# Patient Record
Sex: MALE | Race: WHITE | Employment: FULL TIME | URBAN - METROPOLITAN AREA
[De-identification: names, ages, dates, MRNs, and addresses within clinical notes are randomized per-mention and may not be internally consistent; named-entity substitution may affect disease eponyms.]

---

## 2017-12-17 ENCOUNTER — HOSPITAL ENCOUNTER (OUTPATIENT)
Age: 42
Discharge: HOME | End: 2017-12-17
Attending: EMERGENCY MEDICINE
Payer: COMMERCIAL

## 2017-12-17 VITALS
OXYGEN SATURATION: 96 % | WEIGHT: 288 LBS | BODY MASS INDEX: 33.32 KG/M2 | TEMPERATURE: 98 F | RESPIRATION RATE: 18 BRPM | SYSTOLIC BLOOD PRESSURE: 154 MMHG | DIASTOLIC BLOOD PRESSURE: 95 MMHG | HEIGHT: 78 IN | HEART RATE: 82 BPM

## 2017-12-17 DIAGNOSIS — J01.90 ACUTE SINUSITIS, RECURRENCE NOT SPECIFIED, UNSPECIFIED LOCATION: Primary | ICD-10-CM

## 2017-12-17 PROCEDURE — 99214 OFFICE O/P EST MOD 30 MIN: CPT

## 2017-12-17 PROCEDURE — 99213 OFFICE O/P EST LOW 20 MIN: CPT

## 2017-12-17 RX ORDER — AZITHROMYCIN 250 MG/1
TABLET, FILM COATED ORAL
Qty: 1 PACKAGE | Refills: 0 | Status: SHIPPED | OUTPATIENT
Start: 2017-12-17 | End: 2017-12-22

## 2017-12-17 NOTE — ED PROVIDER NOTES
Patient Seen in: [facility]    History   Patient presents with:  Ear Problem Pain (neurosensory)    Stated Complaint: ear pain    HPI    This patient complains of bilateral ear pain with the right being more severe than t awake and alert nontoxic in appearance  Eyes pupils are equal and reactive  Ears tympanic membranes are mildly dull and opaque bilaterally not hyperemic.   On exam of the mouth there is pharyngeal erythema no exudate uvula midline  Neck there is bilateral s

## 2018-08-30 ENCOUNTER — HOSPITAL ENCOUNTER (OUTPATIENT)
Age: 43
Discharge: HOME | End: 2018-08-30
Payer: COMMERCIAL

## 2018-08-30 VITALS
WEIGHT: 285 LBS | TEMPERATURE: 98 F | BODY MASS INDEX: 32.97 KG/M2 | OXYGEN SATURATION: 100 % | HEIGHT: 78 IN | DIASTOLIC BLOOD PRESSURE: 87 MMHG | SYSTOLIC BLOOD PRESSURE: 132 MMHG | HEART RATE: 66 BPM | RESPIRATION RATE: 18 BRPM

## 2018-08-30 DIAGNOSIS — J04.0 ACUTE LARYNGITIS: Primary | ICD-10-CM

## 2018-08-30 LAB — S PYO AG THROAT QL: NEGATIVE

## 2018-08-30 PROCEDURE — 99212 OFFICE O/P EST SF 10 MIN: CPT

## 2018-08-30 PROCEDURE — 87430 STREP A AG IA: CPT

## 2018-08-30 NOTE — ED INITIAL ASSESSMENT (HPI)
C/o sore throat last couple days. Bilateral ear pain. Hoarse voice. No fever. No N/V/D. Headache at times. Minimal congestion.

## 2018-08-30 NOTE — ED PROVIDER NOTES
Patient Seen in: 5 [facility] beds at the [facility]    History   Patient presents with:  Sore Throat    Stated Complaint: Sore Throat/Ears    HPI    Patient is an otherwise healthy 54-year-old male who presents for evaluation of sore throat, sony Normocephalic and atraumatic.    Right Ear: Hearing, tympanic membrane, external ear and ear canal normal.   Left Ear: Hearing, tympanic membrane, external ear and ear canal normal.   Nose: Nose normal. Right sinus exhibits no maxillary sinus tenderness and

## 2018-09-11 ENCOUNTER — NURSE TRIAGE (OUTPATIENT)
Dept: OTHER | Age: 43
End: 2018-09-11

## 2018-09-11 NOTE — TELEPHONE ENCOUNTER
Action Requested: Summary for Provider     []  Critical Lab, Recommendations Needed  [] Need Additional Advice  []   FYI    []   Need Orders  [] Need Medications Sent to Pharmacy  []  Other     SUMMARY: appt scheduled 9/12/18 to see PCP  PAM Harman

## 2018-09-12 ENCOUNTER — OFFICE VISIT (OUTPATIENT)
Dept: INTERNAL MEDICINE CLINIC | Facility: CLINIC | Age: 43
End: 2018-09-12
Payer: COMMERCIAL

## 2018-09-12 ENCOUNTER — HOSPITAL ENCOUNTER (OUTPATIENT)
Age: 43
Discharge: HOME | End: 2018-09-12
Attending: EMERGENCY MEDICINE
Payer: COMMERCIAL

## 2018-09-12 VITALS
RESPIRATION RATE: 18 BRPM | BODY MASS INDEX: 34.41 KG/M2 | WEIGHT: 297.38 LBS | TEMPERATURE: 98 F | HEIGHT: 78 IN | HEART RATE: 68 BPM | OXYGEN SATURATION: 98 % | SYSTOLIC BLOOD PRESSURE: 149 MMHG | DIASTOLIC BLOOD PRESSURE: 84 MMHG

## 2018-09-12 VITALS
HEART RATE: 67 BPM | RESPIRATION RATE: 20 BRPM | TEMPERATURE: 99 F | DIASTOLIC BLOOD PRESSURE: 95 MMHG | SYSTOLIC BLOOD PRESSURE: 140 MMHG

## 2018-09-12 DIAGNOSIS — L03.119 CELLULITIS OF FOOT: Primary | ICD-10-CM

## 2018-09-12 DIAGNOSIS — J30.2 SEASONAL ALLERGIC RHINITIS, UNSPECIFIED TRIGGER: ICD-10-CM

## 2018-09-12 DIAGNOSIS — Z12.5 SCREENING PSA (PROSTATE SPECIFIC ANTIGEN): ICD-10-CM

## 2018-09-12 DIAGNOSIS — R74.8 ELEVATED LIVER ENZYMES: ICD-10-CM

## 2018-09-12 DIAGNOSIS — R05.9 COUGH: Primary | ICD-10-CM

## 2018-09-12 DIAGNOSIS — Z00.00 PHYSICAL EXAM: ICD-10-CM

## 2018-09-12 DIAGNOSIS — E78.00 PURE HYPERCHOLESTEROLEMIA: ICD-10-CM

## 2018-09-12 PROCEDURE — 99214 OFFICE O/P EST MOD 30 MIN: CPT

## 2018-09-12 PROCEDURE — 99212 OFFICE O/P EST SF 10 MIN: CPT | Performed by: INTERNAL MEDICINE

## 2018-09-12 PROCEDURE — 99213 OFFICE O/P EST LOW 20 MIN: CPT

## 2018-09-12 PROCEDURE — 99214 OFFICE O/P EST MOD 30 MIN: CPT | Performed by: INTERNAL MEDICINE

## 2018-09-12 RX ORDER — CEPHALEXIN 500 MG/1
500 CAPSULE ORAL 4 TIMES DAILY
Qty: 28 CAPSULE | Refills: 0 | Status: SHIPPED | OUTPATIENT
Start: 2018-09-12 | End: 2018-09-19

## 2018-09-12 RX ORDER — FLUTICASONE PROPIONATE 50 MCG
2 SPRAY, SUSPENSION (ML) NASAL DAILY
Qty: 1 BOTTLE | Refills: 0 | Status: SHIPPED | OUTPATIENT
Start: 2018-09-12 | End: 2019-09-07

## 2018-09-12 RX ORDER — LORATADINE 10 MG/1
10 TABLET ORAL DAILY
COMMUNITY

## 2018-09-12 RX ORDER — ALBUTEROL SULFATE 90 UG/1
2 AEROSOL, METERED RESPIRATORY (INHALATION) EVERY 4 HOURS PRN
Qty: 1 INHALER | Refills: 6 | Status: SHIPPED | OUTPATIENT
Start: 2018-09-12

## 2018-09-12 NOTE — PROGRESS NOTES
HPI:    Patient ID: Nayely Werner is a 39year old male. Cough   Chronicity: pt  state has allergies and  has some  wheezing   ,due to    allergies  - happlens  in  fall   season  The current episode started in the past 7 days.  The problem has been DM-Guaifenesin ER (MUCINEX DM)  MG Oral Tablet 12 Hr Take 1 tablet by mouth every 12 (twelve) hours. Disp:  Rfl:      Allergies:  Skelaxin [Metaxalon*       PHYSICAL EXAM:   Physical Exam   Constitutional: He is oriented to person, place, and time. Is   Cough   worsening  Might  Need   z pack   - Albuterol Sulfate HFA (PROAIR HFA) 108 (90 Base) MCG/ACT Inhalation Aero Soln; Inhale 2 puffs into the lungs every 4 (four) hours as needed for Wheezing. Dispense: 1 Inhaler;  Refill: 6  - UA MICROSCOPIC O Aero Soln 1 Inhaler 6     Sig: Inhale 2 puffs into the lungs every 4 (four) hours as needed for Wheezing. • Fluticasone Propionate 50 MCG/ACT Nasal Suspension 1 Bottle 0     Si sprays by Each Nare route daily.  Apply two puffs in each nostril once ruth

## 2018-09-15 ENCOUNTER — LAB ENCOUNTER (OUTPATIENT)
Dept: LAB | Age: 43
End: 2018-09-15
Attending: INTERNAL MEDICINE
Payer: COMMERCIAL

## 2018-09-15 DIAGNOSIS — R05.9 COUGH: ICD-10-CM

## 2018-09-15 DIAGNOSIS — R74.8 ELEVATED LIVER ENZYMES: ICD-10-CM

## 2018-09-15 DIAGNOSIS — E78.00 PURE HYPERCHOLESTEROLEMIA: ICD-10-CM

## 2018-09-15 DIAGNOSIS — Z12.5 SCREENING PSA (PROSTATE SPECIFIC ANTIGEN): ICD-10-CM

## 2018-09-15 DIAGNOSIS — Z00.00 PHYSICAL EXAM: ICD-10-CM

## 2018-09-15 DIAGNOSIS — J30.2 SEASONAL ALLERGIC RHINITIS, UNSPECIFIED TRIGGER: ICD-10-CM

## 2018-09-15 LAB
ALBUMIN SERPL BCP-MCNC: 4.3 G/DL (ref 3.5–4.8)
ALBUMIN/GLOB SERPL: 1.2 {RATIO} (ref 1–2)
ALP SERPL-CCNC: 41 U/L (ref 32–100)
ALT SERPL-CCNC: 32 U/L (ref 17–63)
ANION GAP SERPL CALC-SCNC: 10 MMOL/L (ref 0–18)
AST SERPL-CCNC: 21 U/L (ref 15–41)
BACTERIA UR QL AUTO: NEGATIVE /HPF
BASOPHILS # BLD: 0.1 K/UL (ref 0–0.2)
BASOPHILS NFR BLD: 1 %
BILIRUB SERPL-MCNC: 0.5 MG/DL (ref 0.3–1.2)
BUN SERPL-MCNC: 17 MG/DL (ref 8–20)
BUN/CREAT SERPL: 19.5 (ref 10–20)
CALCIUM SERPL-MCNC: 9.5 MG/DL (ref 8.5–10.5)
CHLORIDE SERPL-SCNC: 105 MMOL/L (ref 95–110)
CHOLEST SERPL-MCNC: 178 MG/DL (ref 110–200)
CO2 SERPL-SCNC: 26 MMOL/L (ref 22–32)
CREAT SERPL-MCNC: 0.87 MG/DL (ref 0.5–1.5)
EOSINOPHIL # BLD: 0.2 K/UL (ref 0–0.7)
EOSINOPHIL NFR BLD: 3 %
ERYTHROCYTE [DISTWIDTH] IN BLOOD BY AUTOMATED COUNT: 13.1 % (ref 11–15)
GGT SERPL-CCNC: 30 U/L (ref 7–50)
GLOBULIN PLAS-MCNC: 3.6 G/DL (ref 2.5–3.7)
GLUCOSE SERPL-MCNC: 93 MG/DL (ref 70–99)
HCT VFR BLD AUTO: 45.1 % (ref 41–52)
HDLC SERPL-MCNC: 38 MG/DL
HGB BLD-MCNC: 15.1 G/DL (ref 13.5–17.5)
LDLC SERPL CALC-MCNC: 113 MG/DL (ref 0–99)
LYMPHOCYTES # BLD: 1.8 K/UL (ref 1–4)
LYMPHOCYTES NFR BLD: 30 %
MCH RBC QN AUTO: 30.9 PG (ref 27–32)
MCHC RBC AUTO-ENTMCNC: 33.6 G/DL (ref 32–37)
MCV RBC AUTO: 92.1 FL (ref 80–100)
MONOCYTES # BLD: 0.6 K/UL (ref 0–1)
MONOCYTES NFR BLD: 11 %
NEUTROPHILS # BLD AUTO: 3.3 K/UL (ref 1.8–7.7)
NEUTROPHILS NFR BLD: 55 %
NONHDLC SERPL-MCNC: 140 MG/DL
OSMOLALITY UR CALC.SUM OF ELEC: 293 MOSM/KG (ref 275–295)
PATIENT FASTING: YES
PLATELET # BLD AUTO: 182 K/UL (ref 140–400)
PMV BLD AUTO: 10 FL (ref 7.4–10.3)
POTASSIUM SERPL-SCNC: 4.3 MMOL/L (ref 3.3–5.1)
PROT SERPL-MCNC: 7.9 G/DL (ref 5.9–8.4)
PSA SERPL-MCNC: 1 NG/ML (ref 0–4)
RBC # BLD AUTO: 4.89 M/UL (ref 4.5–5.9)
RBC #/AREA URNS AUTO: <1 /HPF
SODIUM SERPL-SCNC: 141 MMOL/L (ref 136–144)
TRIGL SERPL-MCNC: 135 MG/DL (ref 1–149)
TSH SERPL-ACNC: 1.71 UIU/ML (ref 0.45–5.33)
WBC # BLD AUTO: 5.9 K/UL (ref 4–11)
WBC #/AREA URNS AUTO: 1 /HPF

## 2018-09-15 PROCEDURE — 87340 HEPATITIS B SURFACE AG IA: CPT

## 2018-09-15 PROCEDURE — 84443 ASSAY THYROID STIM HORMONE: CPT

## 2018-09-15 PROCEDURE — 80500 HEPATITIS A B + C PROFILE: CPT

## 2018-09-15 PROCEDURE — 85025 COMPLETE CBC W/AUTO DIFF WBC: CPT

## 2018-09-15 PROCEDURE — 36415 COLL VENOUS BLD VENIPUNCTURE: CPT

## 2018-09-15 PROCEDURE — 80061 LIPID PANEL: CPT

## 2018-09-15 PROCEDURE — 82977 ASSAY OF GGT: CPT

## 2018-09-15 PROCEDURE — 81015 MICROSCOPIC EXAM OF URINE: CPT

## 2018-09-15 PROCEDURE — 86708 HEPATITIS A ANTIBODY: CPT

## 2018-09-15 PROCEDURE — 86704 HEP B CORE ANTIBODY TOTAL: CPT

## 2018-09-15 PROCEDURE — 86706 HEP B SURFACE ANTIBODY: CPT

## 2018-09-15 PROCEDURE — 80053 COMPREHEN METABOLIC PANEL: CPT

## 2018-09-15 PROCEDURE — 86803 HEPATITIS C AB TEST: CPT

## 2018-09-17 LAB
HAV AB SER QL IA: NONREACTIVE
HBV CORE AB SERPL QL IA: NONREACTIVE
HBV SURFACE AB SER-ACNC: <3.1 MIU/ML (ref ?–10)
HBV SURFACE AG SERPL QL IA: NONREACTIVE
HBV SURFACE AG SERPL QL IA: NONREACTIVE
HCV AB SERPL QL IA: NONREACTIVE

## 2018-09-19 ENCOUNTER — OFFICE VISIT (OUTPATIENT)
Dept: INTERNAL MEDICINE CLINIC | Facility: CLINIC | Age: 43
End: 2018-09-19
Payer: COMMERCIAL

## 2018-09-19 VITALS
WEIGHT: 298.5 LBS | RESPIRATION RATE: 20 BRPM | DIASTOLIC BLOOD PRESSURE: 90 MMHG | SYSTOLIC BLOOD PRESSURE: 147 MMHG | HEIGHT: 78 IN | TEMPERATURE: 98 F | HEART RATE: 67 BPM | BODY MASS INDEX: 34.54 KG/M2

## 2018-09-19 DIAGNOSIS — J30.1 ALLERGIC RHINITIS DUE TO POLLEN, UNSPECIFIED SEASONALITY: Primary | ICD-10-CM

## 2018-09-19 DIAGNOSIS — L03.115 CELLULITIS OF RIGHT LOWER EXTREMITY: ICD-10-CM

## 2018-09-19 PROBLEM — I10 ESSENTIAL HYPERTENSION: Status: ACTIVE | Noted: 2018-09-19

## 2018-09-19 PROCEDURE — 99212 OFFICE O/P EST SF 10 MIN: CPT | Performed by: INTERNAL MEDICINE

## 2018-09-19 PROCEDURE — 99214 OFFICE O/P EST MOD 30 MIN: CPT | Performed by: INTERNAL MEDICINE

## 2018-09-19 NOTE — PROGRESS NOTES
HPI:    Patient ID: Merly Castro is a 39year old male. Patient presents with:  Sore Throat: Pt state that he is presenting from   With cellulitis of right  foot.    Pt state that he has also some  cough and sore throat since this AM    right  foot days. Disp: 28 capsule Rfl: 0   Fluticasone Propionate 50 MCG/ACT Nasal Suspension 2 sprays by Each Nare route daily.  Apply two puffs in each nostril once daily for 1-2 weeks then as needed Disp: 1 Bottle Rfl: 0   DM-Guaifenesin ER (MUCINEX DM)  MG O r  Foot  Redness  Resolved   - no  Tenderness   Or  Erythema    Psychiatric: He has a normal mood and affect. His behavior is normal.   Nursing note and vitals reviewed.             Blood pressure 147/90, pulse 67, temperature 97.5 °F (36.4 °C), temperatu pressure and heart rate at home       Sore throat  No signs of infection  Likely due to postnasal drainage and clearing the throat often  Patient does have some allergy symptoms and will start using Claritin daily  Flonase 2 puffs in each nostril 1-2 weeks

## 2018-09-23 NOTE — ED PROVIDER NOTES
Patient Seen in: [facility]    History   Patient presents with:  Musculoskeletal Problem    Stated Complaint: Foot pain     HPI    48-year-old male presents for evaluation of right foot pain.   Patient reports approximatel oriented to person, place, and time. Skin: Skin is warm. Capillary refill takes less than 2 seconds. Psychiatric: He has a normal mood and affect.              ED Course   Labs Reviewed - No data to display             MDM   Differential diagnosis inclu

## 2020-11-18 ENCOUNTER — VIRTUAL PHONE E/M (OUTPATIENT)
Dept: INTERNAL MEDICINE CLINIC | Facility: CLINIC | Age: 45
End: 2020-11-18
Payer: COMMERCIAL

## 2020-11-18 ENCOUNTER — PATIENT MESSAGE (OUTPATIENT)
Dept: INTERNAL MEDICINE CLINIC | Facility: CLINIC | Age: 45
End: 2020-11-18

## 2020-11-18 DIAGNOSIS — Z20.822 EXPOSURE TO COVID-19 VIRUS: Primary | ICD-10-CM

## 2020-11-18 PROCEDURE — 99213 OFFICE O/P EST LOW 20 MIN: CPT | Performed by: INTERNAL MEDICINE

## 2020-11-18 NOTE — TELEPHONE ENCOUNTER
From: Clement Hadley  To: Alvaro Ayala MD  Sent: 11/18/2020 4:21 PM CST  Subject: Other    I had a 4pm phone appointment with you today. Your call came through without a ID number.  Your voicemail did not include a callback number and I have been going

## 2020-11-19 NOTE — PROGRESS NOTES
Virtual Telephone Check-In    Tita Echo verbally consents to a Virtual/Telephone Check-In visit on 11/18/2020      Patient understands and accepts financial responsibility for any deductible, co-insurance and/or co-pays associated with this service respiratory distress,         ASSESSMENT/PLAN:    Exposure to COVID-19 infection we discussed CDC guidelines, advised patient to quarantine from his wife, wear mask at home and practice good hygiene. Also at work following the same rules.   Report if jaelyn

## 2021-11-12 ENCOUNTER — IMMUNIZATION (OUTPATIENT)
Dept: LAB | Facility: HOSPITAL | Age: 46
End: 2021-11-12
Attending: EMERGENCY MEDICINE
Payer: COMMERCIAL

## 2021-11-12 DIAGNOSIS — Z23 NEED FOR VACCINATION: Primary | ICD-10-CM

## 2021-11-12 PROCEDURE — 0004A SARSCOV2 VAC 30MCG/0.3ML IM: CPT

## 2022-11-23 ENCOUNTER — OFFICE VISIT (OUTPATIENT)
Dept: INTERNAL MEDICINE CLINIC | Facility: CLINIC | Age: 47
End: 2022-11-23
Payer: COMMERCIAL

## 2022-11-23 VITALS
HEART RATE: 78 BPM | DIASTOLIC BLOOD PRESSURE: 90 MMHG | SYSTOLIC BLOOD PRESSURE: 158 MMHG | BODY MASS INDEX: 36.45 KG/M2 | TEMPERATURE: 98 F | OXYGEN SATURATION: 99 % | HEIGHT: 78 IN | WEIGHT: 315 LBS

## 2022-11-23 DIAGNOSIS — Z00.00 PE (PHYSICAL EXAM), ROUTINE: ICD-10-CM

## 2022-11-23 DIAGNOSIS — J45.901 ASTHMA WITH ACUTE EXACERBATION, UNSPECIFIED ASTHMA SEVERITY, UNSPECIFIED WHETHER PERSISTENT: Primary | ICD-10-CM

## 2022-11-23 DIAGNOSIS — E66.01 CLASS 3 SEVERE OBESITY DUE TO EXCESS CALORIES IN ADULT, UNSPECIFIED BMI, UNSPECIFIED WHETHER SERIOUS COMORBIDITY PRESENT (HCC): ICD-10-CM

## 2022-11-23 DIAGNOSIS — R05.9 COUGH: ICD-10-CM

## 2022-11-23 PROCEDURE — 3080F DIAST BP >= 90 MM HG: CPT | Performed by: INTERNAL MEDICINE

## 2022-11-23 PROCEDURE — 3008F BODY MASS INDEX DOCD: CPT | Performed by: INTERNAL MEDICINE

## 2022-11-23 PROCEDURE — 99214 OFFICE O/P EST MOD 30 MIN: CPT | Performed by: INTERNAL MEDICINE

## 2022-11-23 PROCEDURE — 3077F SYST BP >= 140 MM HG: CPT | Performed by: INTERNAL MEDICINE

## 2022-11-23 RX ORDER — BUDESONIDE AND FORMOTEROL FUMARATE DIHYDRATE 160; 4.5 UG/1; UG/1
2 AEROSOL RESPIRATORY (INHALATION) 2 TIMES DAILY
Qty: 1 EACH | Refills: 0 | Status: SHIPPED | OUTPATIENT
Start: 2022-11-23 | End: 2022-11-25

## 2022-11-23 RX ORDER — ALBUTEROL SULFATE 90 UG/1
2 AEROSOL, METERED RESPIRATORY (INHALATION) EVERY 6 HOURS PRN
Qty: 1 EACH | Refills: 1 | Status: SHIPPED | OUTPATIENT
Start: 2022-11-23

## 2022-11-23 RX ORDER — FLUTICASONE PROPIONATE 50 MCG
2 SPRAY, SUSPENSION (ML) NASAL DAILY
Qty: 1 EACH | Refills: 0 | Status: SHIPPED | OUTPATIENT
Start: 2022-11-23

## 2022-11-24 ENCOUNTER — PATIENT MESSAGE (OUTPATIENT)
Dept: INTERNAL MEDICINE CLINIC | Facility: CLINIC | Age: 47
End: 2022-11-24

## 2022-11-25 RX ORDER — FLUTICASONE PROPIONATE AND SALMETEROL 500; 50 UG/1; UG/1
1 POWDER RESPIRATORY (INHALATION) 2 TIMES DAILY
Qty: 60 EACH | Refills: 0 | Status: SHIPPED | OUTPATIENT
Start: 2022-11-25 | End: 2022-11-28 | Stop reason: ALTCHOICE

## 2022-11-25 NOTE — TELEPHONE ENCOUNTER
Dr. Laisha Banda, please see patient message and advise. This RN did ask patient to contact insurance to see which similar medication would be covered. Thanks.

## 2022-11-25 NOTE — TELEPHONE ENCOUNTER
Noted , advair  500/50 1 puff  Bid -Sent to pharmacy      F/u  Visit within 2 weeks or sooner if needed

## 2022-11-27 PROBLEM — J45.901 ASTHMA WITH ACUTE EXACERBATION: Status: ACTIVE | Noted: 2022-11-27

## 2022-11-28 ENCOUNTER — TELEPHONE (OUTPATIENT)
Dept: FAMILY MEDICINE CLINIC | Facility: CLINIC | Age: 47
End: 2022-11-28

## 2022-11-28 RX ORDER — BUDESONIDE AND FORMOTEROL FUMARATE DIHYDRATE 160; 4.5 UG/1; UG/1
2 AEROSOL RESPIRATORY (INHALATION) 2 TIMES DAILY
Qty: 1 EACH | Refills: 0 | COMMUNITY
Start: 2022-11-28

## 2022-12-20 RX ORDER — FLUTICASONE PROPIONATE 50 MCG
SPRAY, SUSPENSION (ML) NASAL
Qty: 16 ML | Refills: 0 | Status: SHIPPED | OUTPATIENT
Start: 2022-12-20

## 2022-12-20 NOTE — TELEPHONE ENCOUNTER
Refill passed per [address] [facility] protocol.     Requested Prescriptions   Pending Prescriptions Disp Refills    FLUTICASONE PROPIONATE 50 MCG/ACT Nasal Suspension [Pharmacy Med Name: FLUTICASONE PROP 50 MCG SPRAY] 16 mL 0     Sig: USE 2 SPRAYS BY NASAL ROUTE DAILY FOR 1 -2 WEEKS THAN AS NEEDED       Allergy Medication Protocol Passed - 12/20/2022 12:01 AM        Passed - In person appointment or virtual visit in the past 12 mos or appointment in next 3 mos     Recent Outpatient Visits              3 weeks ago Asthma with acute exacerbation, unspecified asthma severity, unspecified whether persistent    Willian Fester, [place] [facility], MD    Office Visit    2 years ago Exposure to COVID-19 virus    [address] [facility], [address] [place] [address], Blaire Urbano MD    [facility]    4 years ago Allergic rhinitis due to pollen, unspecified seasonality    Paula Fong MD    Office Visit    4 years ago Yolanda Montes De Oca MD    Office Visit    6 years ago Paula Lyons MD    Office Visit                           Recent Outpatient Visits              3 weeks ago Asthma with acute exacerbation, unspecified asthma severity, unspecified whether persistent    Willian Fester, [place], MD    Office Visit    2 years ago Exposure to COVID-19 virus    [address] [address], [facility], Paola Juarez MD    [facility]    4 years ago Allergic rhinitis due to pollen, unspecified seasonality    Paula Fong MD    Office Visit    4 years ago Paula Marin MD    Office Visit    6 years ago Paula Lyons MD    Office Visit